# Patient Record
Sex: MALE | Race: WHITE | NOT HISPANIC OR LATINO | Employment: FULL TIME | URBAN - METROPOLITAN AREA
[De-identification: names, ages, dates, MRNs, and addresses within clinical notes are randomized per-mention and may not be internally consistent; named-entity substitution may affect disease eponyms.]

---

## 2017-07-27 ENCOUNTER — HOSPITAL ENCOUNTER (EMERGENCY)
Facility: HOSPITAL | Age: 37
Discharge: HOME | End: 2017-07-27
Attending: EMERGENCY MEDICINE | Admitting: EMERGENCY MEDICINE

## 2017-07-27 ENCOUNTER — APPOINTMENT (OUTPATIENT)
Dept: GENERAL RADIOLOGY | Facility: HOSPITAL | Age: 37
End: 2017-07-27

## 2017-07-27 VITALS
HEIGHT: 72 IN | WEIGHT: 190 LBS | SYSTOLIC BLOOD PRESSURE: 134 MMHG | RESPIRATION RATE: 16 BRPM | DIASTOLIC BLOOD PRESSURE: 90 MMHG | BODY MASS INDEX: 25.73 KG/M2 | TEMPERATURE: 95.8 F | HEART RATE: 61 BPM | OXYGEN SATURATION: 97 %

## 2017-07-27 DIAGNOSIS — R07.89 ATYPICAL CHEST PAIN: Primary | ICD-10-CM

## 2017-07-27 LAB
ALBUMIN SERPL-MCNC: 4.5 G/DL (ref 3.5–5.2)
ALBUMIN/GLOB SERPL: 1.6 G/DL
ALP SERPL-CCNC: 83 U/L (ref 39–117)
ALT SERPL W P-5'-P-CCNC: 44 U/L (ref 1–41)
ANION GAP SERPL CALCULATED.3IONS-SCNC: 11.1 MMOL/L
AST SERPL-CCNC: 32 U/L (ref 1–40)
BASOPHILS # BLD AUTO: 0.01 10*3/MM3 (ref 0–0.2)
BASOPHILS NFR BLD AUTO: 0.2 % (ref 0–1.5)
BILIRUB SERPL-MCNC: 0.5 MG/DL (ref 0.1–1.2)
BUN BLD-MCNC: 15 MG/DL (ref 6–20)
BUN/CREAT SERPL: 14.9 (ref 7–25)
CALCIUM SPEC-SCNC: 9.4 MG/DL (ref 8.6–10.5)
CHLORIDE SERPL-SCNC: 101 MMOL/L (ref 98–107)
CO2 SERPL-SCNC: 27.9 MMOL/L (ref 22–29)
CREAT BLD-MCNC: 1.01 MG/DL (ref 0.76–1.27)
D DIMER PPP FEU-MCNC: 0.27 MCGFEU/ML (ref 0–0.49)
DEPRECATED RDW RBC AUTO: 40.3 FL (ref 37–54)
EOSINOPHIL # BLD AUTO: 0.05 10*3/MM3 (ref 0–0.7)
EOSINOPHIL NFR BLD AUTO: 0.8 % (ref 0.3–6.2)
ERYTHROCYTE [DISTWIDTH] IN BLOOD BY AUTOMATED COUNT: 12.7 % (ref 11.5–14.5)
GFR SERPL CREATININE-BSD FRML MDRD: 84 ML/MIN/1.73
GLOBULIN UR ELPH-MCNC: 2.9 GM/DL
GLUCOSE BLD-MCNC: 132 MG/DL (ref 65–99)
HCT VFR BLD AUTO: 43.9 % (ref 40.4–52.2)
HGB BLD-MCNC: 15.4 G/DL (ref 13.7–17.6)
IMM GRANULOCYTES # BLD: 0 10*3/MM3 (ref 0–0.03)
IMM GRANULOCYTES NFR BLD: 0 % (ref 0–0.5)
LYMPHOCYTES # BLD AUTO: 1.36 10*3/MM3 (ref 0.9–4.8)
LYMPHOCYTES NFR BLD AUTO: 21.9 % (ref 19.6–45.3)
MCH RBC QN AUTO: 31.1 PG (ref 27–32.7)
MCHC RBC AUTO-ENTMCNC: 35.1 G/DL (ref 32.6–36.4)
MCV RBC AUTO: 88.7 FL (ref 79.8–96.2)
MONOCYTES # BLD AUTO: 0.43 10*3/MM3 (ref 0.2–1.2)
MONOCYTES NFR BLD AUTO: 6.9 % (ref 5–12)
NEUTROPHILS # BLD AUTO: 4.36 10*3/MM3 (ref 1.9–8.1)
NEUTROPHILS NFR BLD AUTO: 70.2 % (ref 42.7–76)
PLATELET # BLD AUTO: 150 10*3/MM3 (ref 140–500)
PMV BLD AUTO: 10.3 FL (ref 6–12)
POTASSIUM BLD-SCNC: 3.9 MMOL/L (ref 3.5–5.2)
PROT SERPL-MCNC: 7.4 G/DL (ref 6–8.5)
RBC # BLD AUTO: 4.95 10*6/MM3 (ref 4.6–6)
SODIUM BLD-SCNC: 140 MMOL/L (ref 136–145)
TROPONIN T SERPL-MCNC: <0.01 NG/ML (ref 0–0.03)
WBC NRBC COR # BLD: 6.21 10*3/MM3 (ref 4.5–10.7)

## 2017-07-27 PROCEDURE — 93010 ELECTROCARDIOGRAM REPORT: CPT | Performed by: INTERNAL MEDICINE

## 2017-07-27 PROCEDURE — 85379 FIBRIN DEGRADATION QUANT: CPT | Performed by: EMERGENCY MEDICINE

## 2017-07-27 PROCEDURE — 25010000002 KETOROLAC TROMETHAMINE PER 15 MG: Performed by: EMERGENCY MEDICINE

## 2017-07-27 PROCEDURE — 99284 EMERGENCY DEPT VISIT MOD MDM: CPT

## 2017-07-27 PROCEDURE — 71020 HC CHEST PA AND LATERAL: CPT

## 2017-07-27 PROCEDURE — 99283 EMERGENCY DEPT VISIT LOW MDM: CPT

## 2017-07-27 PROCEDURE — 84484 ASSAY OF TROPONIN QUANT: CPT | Performed by: EMERGENCY MEDICINE

## 2017-07-27 PROCEDURE — 85025 COMPLETE CBC W/AUTO DIFF WBC: CPT | Performed by: EMERGENCY MEDICINE

## 2017-07-27 PROCEDURE — 96374 THER/PROPH/DIAG INJ IV PUSH: CPT

## 2017-07-27 PROCEDURE — 93005 ELECTROCARDIOGRAM TRACING: CPT | Performed by: EMERGENCY MEDICINE

## 2017-07-27 PROCEDURE — 80053 COMPREHEN METABOLIC PANEL: CPT | Performed by: EMERGENCY MEDICINE

## 2017-07-27 RX ORDER — METAXALONE 800 MG/1
800 TABLET ORAL 3 TIMES DAILY
Qty: 15 TABLET | Refills: 0 | Status: SHIPPED | OUTPATIENT
Start: 2017-07-27 | End: 2017-12-18

## 2017-07-27 RX ORDER — KETOROLAC TROMETHAMINE 30 MG/ML
30 INJECTION, SOLUTION INTRAMUSCULAR; INTRAVENOUS ONCE
Status: COMPLETED | OUTPATIENT
Start: 2017-07-27 | End: 2017-07-27

## 2017-07-27 RX ORDER — SODIUM CHLORIDE 0.9 % (FLUSH) 0.9 %
10 SYRINGE (ML) INJECTION AS NEEDED
Status: DISCONTINUED | OUTPATIENT
Start: 2017-07-27 | End: 2017-07-27 | Stop reason: HOSPADM

## 2017-07-27 RX ADMIN — KETOROLAC TROMETHAMINE 30 MG: 30 INJECTION, SOLUTION INTRAMUSCULAR at 09:43

## 2017-08-01 ENCOUNTER — TELEPHONE (OUTPATIENT)
Dept: SOCIAL WORK | Facility: HOSPITAL | Age: 37
End: 2017-08-01

## 2017-12-18 ENCOUNTER — HOSPITAL ENCOUNTER (OUTPATIENT)
Dept: GENERAL RADIOLOGY | Facility: HOSPITAL | Age: 37
Discharge: HOME | End: 2017-12-18
Admitting: FAMILY MEDICINE

## 2017-12-18 ENCOUNTER — OFFICE VISIT (OUTPATIENT)
Dept: FAMILY MEDICINE CLINIC | Facility: CLINIC | Age: 37
End: 2017-12-18

## 2017-12-18 VITALS
TEMPERATURE: 98.3 F | SYSTOLIC BLOOD PRESSURE: 134 MMHG | WEIGHT: 199.8 LBS | HEIGHT: 72 IN | OXYGEN SATURATION: 100 % | DIASTOLIC BLOOD PRESSURE: 88 MMHG | BODY MASS INDEX: 27.06 KG/M2 | HEART RATE: 93 BPM

## 2017-12-18 DIAGNOSIS — M54.41 ACUTE BILATERAL LOW BACK PAIN WITH BILATERAL SCIATICA: Primary | ICD-10-CM

## 2017-12-18 DIAGNOSIS — M54.42 ACUTE BILATERAL LOW BACK PAIN WITH BILATERAL SCIATICA: Primary | ICD-10-CM

## 2017-12-18 PROCEDURE — 99213 OFFICE O/P EST LOW 20 MIN: CPT | Performed by: FAMILY MEDICINE

## 2017-12-18 PROCEDURE — 72110 X-RAY EXAM L-2 SPINE 4/>VWS: CPT

## 2017-12-18 RX ORDER — NAPROXEN 500 MG/1
500 TABLET ORAL 2 TIMES DAILY WITH MEALS
Qty: 30 TABLET | Refills: 0 | Status: SHIPPED | OUTPATIENT
Start: 2017-12-18 | End: 2019-03-19

## 2017-12-18 NOTE — PROGRESS NOTES
"Subjective   Mookie Torres is a 37 y.o. male.     Chief Complaint   Patient presents with   • Back Pain     x fri after picking up a tv         History of Present Illness    Low back pain.  Sudden onset.  Friday.  At home.  He moved recently.  Doing some lifting.  Nothing very heavy.  He was lifting up a television, but over at the waist.  Lifted up.  And suddenly had back spasm and pain.  Felt like something \"gave out\".  He crumpled to the ground.  He's had some bilateral anterior thigh pain.  He's had no weakness.  No numbness.  He feels like sometimes when he walks a things are going to \"give way\" and he feels \"unstable\".  He's had no urinary changes.  No blood in the urine.  He's had no bowel movements, he has been constipated.  He is taking Advil or over-the-counter Aleve.  No previous back trauma in recent months or weeks.  He has a remote history of opioid dependence.  He's been on Suboxone treatment for some years.      The following portions of the patient's history were reviewed and updated as appropriate: allergies, current medications, past family history, past medical history, past social history, past surgical history and problem list.          Review of Systems   Constitutional: Negative for fever.   Genitourinary: Negative.    Musculoskeletal: Positive for back pain. Negative for joint swelling.   Neurological: Negative for weakness and numbness.       Objective   Blood pressure 134/88, pulse 93, temperature 98.3 °F (36.8 °C), temperature source Oral, height 182.9 cm (72.01\"), weight 90.6 kg (199 lb 12.8 oz), SpO2 100 %.  Physical Exam   Constitutional: No distress.   Musculoskeletal:   Lumbar spine with limited range of motion.  He has no midline pain to palpation.  There some bilateral paralumbar tenderness and also some left-sided spasm at about the L5 level.  There is a negative straight leg lift.  Although he does have some back pain with straight leg lift but no radiculopathy.  The ankles and " knees half +2 out of 4 reflexes.  Strength is 5 out of 5 all major muscular in lower extremity.  Gait is fairly unremarkable.  He is guarding somewhat because of the back pain.       Assessment/Plan   Mookie was seen today for back pain.    Diagnoses and all orders for this visit:    Acute bilateral low back pain with bilateral sciatica  -     XR Spine Lumbar 4+ View  -     Ambulatory Referral to Physical Therapy Evaluate and treat    Other orders  -     naproxen (NAPROSYN) 500 MG tablet; Take 1 tablet by mouth 2 (Two) Times a Day With Meals.      Acute musculoskeletal low back pain with some referred pain versus sciatica to the bilateral anterior thighs.  At this time no definite evidence of neurological claudication or other severe symptoms.  Patient is concerned about a feeling of instability.  I am getting an x-ray of lumbar spine to rule out significant spondylolisthesis or other abnormality.  Prescribing naproxen 500 mg twice a day as needed.  Also recommend physical therapy.  I believe the patient should work.  At this time he is okay for light lifting.  If symptoms persist into the new year, about 2 weeks that is, recommend he let us know I would need further evaluation.

## 2017-12-19 NOTE — PROGRESS NOTES
The x-ray of the spine showed no obvious cause of back pain.  Continue current plan.  If not better in 6 weeks, let us know.

## 2018-06-25 ENCOUNTER — OFFICE VISIT (OUTPATIENT)
Dept: FAMILY MEDICINE CLINIC | Facility: CLINIC | Age: 38
End: 2018-06-25

## 2018-06-25 VITALS
HEIGHT: 72 IN | DIASTOLIC BLOOD PRESSURE: 90 MMHG | OXYGEN SATURATION: 99 % | BODY MASS INDEX: 28.23 KG/M2 | WEIGHT: 208.4 LBS | TEMPERATURE: 98.1 F | HEART RATE: 61 BPM | SYSTOLIC BLOOD PRESSURE: 132 MMHG

## 2018-06-25 DIAGNOSIS — J45.990 EXERCISE-INDUCED BRONCHOSPASM: ICD-10-CM

## 2018-06-25 DIAGNOSIS — M10.071 ACUTE IDIOPATHIC GOUT OF RIGHT FOOT: Primary | ICD-10-CM

## 2018-06-25 PROCEDURE — 99213 OFFICE O/P EST LOW 20 MIN: CPT | Performed by: FAMILY MEDICINE

## 2018-06-25 RX ORDER — RANITIDINE 150 MG/1
150 TABLET ORAL 2 TIMES DAILY
Qty: 30 TABLET | Refills: 0 | Status: SHIPPED | OUTPATIENT
Start: 2018-06-25 | End: 2019-03-19

## 2018-06-25 RX ORDER — ALBUTEROL SULFATE 90 UG/1
2 AEROSOL, METERED RESPIRATORY (INHALATION) EVERY 4 HOURS PRN
Qty: 1 INHALER | Refills: 1 | OUTPATIENT
Start: 2018-06-25 | End: 2021-09-23

## 2018-06-25 RX ORDER — PREDNISONE 20 MG/1
TABLET ORAL
Qty: 18 TABLET | Refills: 0 | Status: SHIPPED | OUTPATIENT
Start: 2018-06-25 | End: 2018-07-11 | Stop reason: SDUPTHER

## 2018-06-25 NOTE — PATIENT INSTRUCTIONS
Gout  Gout is painful swelling that can occur in some of your joints. Gout is a type of arthritis. This condition is caused by having too much uric acid in your body. Uric acid is a chemical that forms when your body breaks down substances called purines. Purines are important for building body proteins.  When your body has too much uric acid, sharp crystals can form and build up inside your joints. This causes pain and swelling. Gout attacks can happen quickly and be very painful (acute gout). Over time, the attacks can affect more joints and become more frequent (chronic gout). Gout can also cause uric acid to build up under your skin and inside your kidneys.  What are the causes?  This condition is caused by too much uric acid in your blood. This can occur because:  · Your kidneys do not remove enough uric acid from your blood. This is the most common cause.  · Your body makes too much uric acid. This can occur with some cancers and cancer treatments. It can also occur if your body is breaking down too many red blood cells (hemolytic anemia).  · You eat too many foods that are high in purines. These foods include organ meats and some seafood. Alcohol, especially beer, is also high in purines.    A gout attack may be triggered by trauma or stress.  What increases the risk?  This condition is more likely to develop in people who:  · Have a family history of gout.  · Are male and middle-aged.  · Are female and have gone through menopause.  · Are obese.  · Frequently drink alcohol, especially beer.  · Are dehydrated.  · Lose weight too quickly.  · Have an organ transplant.  · Have lead poisoning.  · Take certain medicines, including aspirin, cyclosporine, diuretics, levodopa, and niacin.  · Have kidney disease or psoriasis.    What are the signs or symptoms?  An attack of acute gout happens quickly. It usually occurs in just one joint. The most common place is the big toe. Attacks often start at night. Other joints  that may be affected include joints of the feet, ankle, knee, fingers, wrist, or elbow. Symptoms may include:  · Severe pain.  · Warmth.  · Swelling.  · Stiffness.  · Tenderness. The affected joint may be very painful to touch.  · Shiny, red, or purple skin.  · Chills and fever.    Chronic gout may cause symptoms more frequently. More joints may be involved. You may also have white or yellow lumps (tophi) on your hands or feet or in other areas near your joints.  How is this diagnosed?  This condition is diagnosed based on your symptoms, medical history, and physical exam. You may have tests, such as:  · Blood tests to measure uric acid levels.  · Removal of joint fluid with a needle (aspiration) to look for uric acid crystals.  · X-rays to look for joint damage.    How is this treated?  Treatment for this condition has two phases: treating an acute attack and preventing future attacks. Acute gout treatment may include medicines to reduce pain and swelling, including:  · NSAIDs.  · Steroids. These are strong anti-inflammatory medicines that can be taken by mouth (orally) or injected into a joint.  · Colchicine. This medicine relieves pain and swelling when it is taken soon after an attack. It can be given orally or through an IV tube.    Preventive treatment may include:  · Daily use of smaller doses of NSAIDs or colchicine.  · Use of a medicine that reduces uric acid levels in your blood.  · Changes to your diet. You may need to see a specialist about healthy eating (dietitian).    Follow these instructions at home:  During a Gout Attack  · If directed, apply ice to the affected area:  ? Put ice in a plastic bag.  ? Place a towel between your skin and the bag.  ? Leave the ice on for 20 minutes, 2-3 times a day.  · Rest the joint as much as possible. If the affected joint is in your leg, you may be given crutches to use.  · Raise (elevate) the affected joint above the level of your heart as often as  possible.  · Drink enough fluids to keep your urine clear or pale yellow.  · Take over-the-counter and prescription medicines only as told by your health care provider.  · Do not drive or operate heavy machinery while taking prescription pain medicine.  · Follow instructions from your health care provider about eating or drinking restrictions.  · Return to your normal activities as told by your health care provider. Ask your health care provider what activities are safe for you.  Avoiding Future Gout Attacks  · Follow a low-purine diet as told by your dietitian or health care provider. Avoid foods and drinks that are high in purines, including liver, kidney, anchovies, asparagus, herring, mushrooms, mussels, and beer.  · Limit alcohol intake to no more than 1 drink a day for nonpregnant women and 2 drinks a day for men. One drink equals 12 oz of beer, 5 oz of wine, or 1½ oz of hard liquor.  · Maintain a healthy weight or lose weight if you are overweight. If you want to lose weight, talk with your health care provider. It is important that you do not lose weight too quickly.  · Start or maintain an exercise program as told by your health care provider.  · Drink enough fluids to keep your urine clear or pale yellow.  · Take over-the-counter and prescription medicines only as told by your health care provider.  · Keep all follow-up visits as told by your health care provider. This is important.  Contact a health care provider if:  · You have another gout attack.  · You continue to have symptoms of a gout attack after10 days of treatment.  · You have side effects from your medicines.  · You have chills or a fever.  · You have burning pain when you urinate.  · You have pain in your lower back or belly.  Get help right away if:  · You have severe or uncontrolled pain.  · You cannot urinate.  This information is not intended to replace advice given to you by your health care provider. Make sure you discuss any questions  you have with your health care provider.  Document Released: 12/15/2001 Document Revised: 05/25/2017 Document Reviewed: 09/29/2016  [employer]vier Interactive Patient Education © 2018 [employer]

## 2018-06-25 NOTE — PROGRESS NOTES
"Subjective   Mookie Torres is a 37 y.o. male.     Chief Complaint   Patient presents with   • Foot Pain     right foot pain, NKI x 9 days        History of Present Illness    9 days of right foot pain.  Based of his great toe.  Bothers him.  Swollen.  Decreased range of motion.  Painful to walk on it.  Bothers him at night sometimes.  He feels a little bit nauseated and ill at times.  Otherwise doing well.  He states he has a poor diet.  Eats a lot of red meat.  He drinks some block on weekends.  One or 2 shots.  He continues to be in remission for his previous opioid abuse disorder.  He continues on Suboxone treatment by a outside provider.  He's had similar foot pain in the past.  Same spot.  Right great toe area.  2 times in the last year.  Last about 3 days each.  The pain is 7 out of 10 at times.      The following portions of the patient's history were reviewed and updated as appropriate: allergies, current medications, past family history, past medical history, past social history, past surgical history and problem list.          Review of Systems   Constitutional: Negative.  Negative for fever.   Respiratory: Negative.    Cardiovascular: Negative.    Musculoskeletal: Positive for joint swelling.       Objective   Blood pressure 132/90, pulse 61, temperature 98.1 °F (36.7 °C), temperature source Oral, height 182.9 cm (72.01\"), weight 94.5 kg (208 lb 6.4 oz), SpO2 99 %.  Physical Exam   Constitutional: He appears well-developed and well-nourished.   Cardiovascular: Normal rate and regular rhythm.    Pulmonary/Chest: Effort normal.   Musculoskeletal:   Right phalangeal metatarsal joint, #1, reveals decreased range of motion with some overlying mild bela erythema.  There is some mild joint line tenderness over this.  Aspect.  There is no pain with compression of the metatarsal heads.  Tendon function intact.  Full range of motion ankle.  He ambulates with mild pain.       Assessment/Plan   Mookie was seen today " for foot pain.    Diagnoses and all orders for this visit:    Acute idiopathic gout of right foot  -     Basic Metabolic Panel  -     Uric acid    Exercise-induced bronchospasm  -     albuterol (PROVENTIL HFA;VENTOLIN HFA) 108 (90 Base) MCG/ACT inhaler; Inhale 2 puffs Every 4 (Four) Hours As Needed for Wheezing.    Other orders  -     predniSONE (DELTASONE) 20 MG tablet; 3 po qd for 3 days then 2 po qd for 3 days then 1 po qd for 3 days then stop  -     raNITIdine (ZANTAC) 150 MG tablet; Take 1 tablet by mouth 2 (Two) Times a Day. To prevent stomach upset with prednison        Probable gout.  Checking BMP and uric acid.  Instructions given.  Differential diagnosis includes pseudogout, tendinopathy, possible Link's neuroma.  No evidence of cellulitis.  This is most likely gout.  Recommend improve diet with decreased red meat.  I cannot recommend alcohol use.  Prednisone taper.  Zantac as needed for GI upset, patient states he was taking naproxen for this that cause stomach upset.  He will call if not improving within 72 hours.  To consider allopurinol use and recurrent symptoms.

## 2018-06-26 LAB
BUN SERPL-MCNC: 19 MG/DL (ref 6–20)
BUN/CREAT SERPL: 15.8 (ref 7–25)
CALCIUM SERPL-MCNC: 10.4 MG/DL (ref 8.6–10.5)
CHLORIDE SERPL-SCNC: 94 MMOL/L (ref 98–107)
CO2 SERPL-SCNC: 31.6 MMOL/L (ref 22–29)
CREAT SERPL-MCNC: 1.2 MG/DL (ref 0.76–1.27)
GFR SERPLBLD CREATININE-BSD FMLA CKD-EPI: 68 ML/MIN/1.73
GFR SERPLBLD CREATININE-BSD FMLA CKD-EPI: 83 ML/MIN/1.73
GLUCOSE SERPL-MCNC: 80 MG/DL (ref 65–99)
POTASSIUM SERPL-SCNC: 4 MMOL/L (ref 3.5–5.2)
SODIUM SERPL-SCNC: 138 MMOL/L (ref 136–145)
URATE SERPL-MCNC: 8.5 MG/DL (ref 3.4–7)

## 2018-07-11 DIAGNOSIS — E79.0 ELEVATED BLOOD URIC ACID LEVEL: Primary | ICD-10-CM

## 2018-07-11 RX ORDER — ALLOPURINOL 100 MG/1
100 TABLET ORAL DAILY
Qty: 30 TABLET | Refills: 5 | Status: CANCELLED | OUTPATIENT
Start: 2018-07-11

## 2018-07-11 RX ORDER — PREDNISONE 20 MG/1
TABLET ORAL
Qty: 18 TABLET | Refills: 0 | Status: SHIPPED | OUTPATIENT
Start: 2018-07-11 | End: 2018-08-17

## 2018-07-11 RX ORDER — ALLOPURINOL 100 MG/1
100 TABLET ORAL DAILY
Qty: 30 TABLET | Refills: 5 | Status: SHIPPED | OUTPATIENT
Start: 2018-07-11 | End: 2018-08-20

## 2018-07-11 NOTE — TELEPHONE ENCOUNTER
Pt is calling he was seen on 6/25/18 for gout of the right foot. Was given prednisone. He said this has helped for about 1 wk. But it now has came back. But per your not It said that you would consider allopurinol. Please advise.

## 2018-07-11 NOTE — TELEPHONE ENCOUNTER
Please refill the previous prednisone dose.  Once the gout is improving, then start allopurinol.  If he starts the allopurinol today, it could make the gout worse.  He needs to see me if not improving soon.

## 2018-08-17 ENCOUNTER — OFFICE VISIT (OUTPATIENT)
Dept: FAMILY MEDICINE CLINIC | Facility: CLINIC | Age: 38
End: 2018-08-17

## 2018-08-17 VITALS
BODY MASS INDEX: 28.31 KG/M2 | DIASTOLIC BLOOD PRESSURE: 102 MMHG | HEIGHT: 72 IN | WEIGHT: 209 LBS | HEART RATE: 99 BPM | SYSTOLIC BLOOD PRESSURE: 160 MMHG | OXYGEN SATURATION: 96 % | TEMPERATURE: 98.7 F

## 2018-08-17 DIAGNOSIS — I10 ESSENTIAL HYPERTENSION: Primary | ICD-10-CM

## 2018-08-17 DIAGNOSIS — M10.071 ACUTE IDIOPATHIC GOUT OF RIGHT FOOT: ICD-10-CM

## 2018-08-17 DIAGNOSIS — L20.82 FLEXURAL ECZEMA: ICD-10-CM

## 2018-08-17 PROCEDURE — 99214 OFFICE O/P EST MOD 30 MIN: CPT | Performed by: FAMILY MEDICINE

## 2018-08-17 RX ORDER — PREDNISONE 20 MG/1
60 TABLET ORAL DAILY
Qty: 15 TABLET | Refills: 0 | Status: SHIPPED | OUTPATIENT
Start: 2018-08-17 | End: 2019-03-19

## 2018-08-17 RX ORDER — LISINOPRIL 10 MG/1
10 TABLET ORAL DAILY
Qty: 90 TABLET | Refills: 1 | Status: SHIPPED | OUTPATIENT
Start: 2018-08-17 | End: 2021-09-23

## 2018-08-17 NOTE — PROGRESS NOTES
Subjective   Mookie Torres is a 37 y.o. male.     Chief Complaint   Patient presents with   • Rash     on arms and chest x 5 days needs a note for work   • Gout      right foot x 2 wks   • Headache     not able to eat due to he feels full        History of Present Illness    Patient complains of a gout flare.  Mid foot.  Right foot.  2 weeks ago.  He was taking aspirin which seemed to help.  He was diagnosed with gout here about a month or 2 ago.  Uric acid level is high.  We treated with prednisone and a H2 blocker to help with GI symptoms.  Was then started on allopurinol once a clear.  Is now 100 mg allopurinol daily.  He states the symptoms are now much better.    Rash.  Bilateral anterior cubital fossa.  A few areas on his chest which is now better.  No new foods.  No new medication other than the allopurinol 2 months ago.  Some allergy symptoms or cold symptoms recently.  No new soaps or detergents.  Itchy.  He's had some stressors.  Mother has been ill in the hospital.  His aunt recently .    Hypertension.  Blood pressure elevated today.  Borderline elevated recently.  He states he was treated for high blood pressure years ago he's not sure what, possibly metoprolol.  Mild headaches.  Some fullness in his abdomen with decreased appetite.  No blood in the stool no change in the stool no vomiting.  No weight loss.  No pain in the abdomen.  No severe headache.  No chest pain.  No shortness of breath.  No exertional symptoms.      The following portions of the patient's history were reviewed and updated as appropriate: allergies, current medications, past family history, past medical history, past social history, past surgical history and problem list.          Review of Systems   Constitutional: Negative.    Respiratory: Negative.    Cardiovascular: Negative.    Musculoskeletal: Positive for joint swelling.   Neurological: Positive for headaches.   Psychiatric/Behavioral: Negative.        Objective   Blood  "pressure (!) 160/102, pulse 99, temperature 98.7 °F (37.1 °C), temperature source Oral, height 182.9 cm (72.01\"), weight 94.8 kg (209 lb), SpO2 96 %.  Physical Exam   Constitutional: He appears well-developed and well-nourished. No distress.   HENT:   Head: Normocephalic and atraumatic.   Eyes: Pupils are equal, round, and reactive to light. Conjunctivae and EOM are normal.   Neck: Normal range of motion. Neck supple. No thyromegaly present.   Cardiovascular: Normal rate, regular rhythm, normal heart sounds and intact distal pulses.    Pulmonary/Chest: Effort normal and breath sounds normal.   Abdominal: Soft. Bowel sounds are normal. He exhibits no distension and no mass. There is no tenderness. There is no guarding.   Musculoskeletal: He exhibits no edema.   Examination right foot is unremarkable.  No erythema.  No swelling.  No tenderness to palpation.  Minimal discomfort with ambulation.   Skin: Skin is warm and dry. Rash noted.   A small eczematous vesicular rash bilateral antecubital fossa.  No other rash noted.  No synovitis.   Psychiatric: He has a normal mood and affect. His behavior is normal. Judgment and thought content normal.   Nursing note and vitals reviewed.      Assessment/Plan   Mookie was seen today for rash, gout and headache.    Diagnoses and all orders for this visit:    Essential hypertension  -     Uric acid  -     Comprehensive Metabolic Panel    Acute idiopathic gout of right foot  -     Uric acid  -     Comprehensive Metabolic Panel    Flexural eczema    Other orders  -     lisinopril (PRINIVIL,ZESTRIL) 10 MG tablet; Take 1 tablet by mouth Daily.  -     triamcinolone (KENALOG) 0.1 % ointment; Apply  topically to the appropriate area as directed 2 (Two) Times a Day.  -     predniSONE (DELTASONE) 20 MG tablet; Take 3 tablets by mouth Daily. For severe gout symptoms      Hypertension.  Recurrent diagnosis.  Recommend lisinopril 10 mg daily.  Recheck 6 weeks.  Patient understands go directly " to the emergency room with severe headache or other severe symptoms.    Gout.  Recent flare.  Resolving.  Recheck uric acid.  Target less than 6.  Increase allopurinol if needed.  Patient does have a prescription for prednisone he can take for a gout flare.  He should take this with ranitidine help reduce stomach upset.  Next    Eczema.  Likely related to situational stressors.  Triamcinolone ointment as needed.

## 2018-08-18 LAB
ALBUMIN SERPL-MCNC: 5 G/DL (ref 3.5–5.2)
ALBUMIN/GLOB SERPL: 2.1 G/DL
ALP SERPL-CCNC: 100 U/L (ref 39–117)
ALT SERPL-CCNC: 195 U/L (ref 1–41)
AST SERPL-CCNC: 85 U/L (ref 1–40)
BILIRUB SERPL-MCNC: 0.7 MG/DL (ref 0.1–1.2)
BUN SERPL-MCNC: 16 MG/DL (ref 6–20)
BUN/CREAT SERPL: 13.6 (ref 7–25)
CALCIUM SERPL-MCNC: 9.4 MG/DL (ref 8.6–10.5)
CHLORIDE SERPL-SCNC: 96 MMOL/L (ref 98–107)
CO2 SERPL-SCNC: 29.4 MMOL/L (ref 22–29)
CREAT SERPL-MCNC: 1.18 MG/DL (ref 0.76–1.27)
GLOBULIN SER CALC-MCNC: 2.4 GM/DL
GLUCOSE SERPL-MCNC: 178 MG/DL (ref 65–99)
POTASSIUM SERPL-SCNC: 3.7 MMOL/L (ref 3.5–5.2)
PROT SERPL-MCNC: 7.4 G/DL (ref 6–8.5)
SODIUM SERPL-SCNC: 140 MMOL/L (ref 136–145)
URATE SERPL-MCNC: 7.3 MG/DL (ref 3.4–7)

## 2018-08-20 ENCOUNTER — RESULTS ENCOUNTER (OUTPATIENT)
Dept: FAMILY MEDICINE CLINIC | Facility: CLINIC | Age: 38
End: 2018-08-20

## 2018-08-20 DIAGNOSIS — R79.89 ELEVATED LFTS: ICD-10-CM

## 2018-08-20 DIAGNOSIS — R79.89 ELEVATED LFTS: Primary | ICD-10-CM

## 2018-08-20 DIAGNOSIS — M10.071 ACUTE IDIOPATHIC GOUT OF RIGHT FOOT: ICD-10-CM

## 2018-08-20 NOTE — PROGRESS NOTES
I called patient this morning.  Spoke with him.  He is to stop the allopurinol.  He understands to have lab work done later this week.  See note in chart.

## 2018-08-20 NOTE — PROGRESS NOTES
Phone call with patient.  Elevation of liver enzymes.  Especially the ALT.  He's had mild elevations before, but now moderately to severely elevated.  We reviewed his alcohol intake.  Minimal.  He is not taking acetaminophen.  He's had some intermittent bloating and nonspecific abdominal discomfort.  No right upper quadrant pain.  No pain with eating.  Negative hepatitis C testing about 3 years ago.  Recently started on allopurinol for prevention of gout.  Allopurinol can cause hepatotoxicity.  I'm recommending he stop the allopurinol.  He is going to make a lab appointment later this week to reevaluate liver function testing plus workup for other causes of elevated liver enzymes, see lab orders.  At this point he does not need to see me in the office since he overall feels well with exception of the abdominal bloating.  However he understands worse symptoms he is to see me.  If above lab work negative, and liver enzymes not improved off allopurinol, recommend ultrasound.  Also consultation with specialty care if indicated.  He has a order of prednisone available to take if the gout becomes severe.  He currently is gout pain free.

## 2018-08-24 LAB
ACTIN IGG SERPL-ACNC: 13 UNITS (ref 0–19)
ALBUMIN SERPL-MCNC: 4.7 G/DL (ref 3.5–5.2)
ALP SERPL-CCNC: 94 U/L (ref 39–117)
ALT SERPL-CCNC: 170 U/L (ref 1–41)
AST SERPL-CCNC: 78 U/L (ref 1–40)
BILIRUB DIRECT SERPL-MCNC: <0.2 MG/DL (ref 0–0.3)
BILIRUB SERPL-MCNC: 0.6 MG/DL (ref 0.1–1.2)
FERRITIN SERPL-MCNC: 433.6 NG/ML (ref 30–400)
HBV CORE AB SERPL QL IA: NEGATIVE
HBV CORE IGM SERPL QL IA: NEGATIVE
HBV E AB SERPL QL IA: NEGATIVE
HBV E AG SERPL QL IA: NEGATIVE
HBV SURFACE AB SER QL: NON REACTIVE
HBV SURFACE AG SERPL QL IA: NEGATIVE
HCV AB S/CO SERPL IA: 0.1 S/CO RATIO (ref 0–0.9)
IRON SATN MFR SERPL: 27 % (ref 20–50)
IRON SERPL-MCNC: 103 MCG/DL (ref 59–158)
LABORATORY COMMENT REPORT: NORMAL
MITOCHONDRIA M2 IGG SER-ACNC: <20 UNITS (ref 0–20)
PROT SERPL-MCNC: 7.6 G/DL (ref 6–8.5)
TIBC SERPL-MCNC: 383 MCG/DL
UIBC SERPL-MCNC: 280 MCG/DL

## 2018-08-25 ENCOUNTER — RESULTS ENCOUNTER (OUTPATIENT)
Dept: FAMILY MEDICINE CLINIC | Facility: CLINIC | Age: 38
End: 2018-08-25

## 2018-08-25 DIAGNOSIS — R79.89 ELEVATED LFTS: ICD-10-CM

## 2018-08-27 DIAGNOSIS — R79.89 ELEVATED LFTS: ICD-10-CM

## 2018-08-27 DIAGNOSIS — R79.89 ELEVATED LFTS: Primary | ICD-10-CM

## 2018-08-27 NOTE — PROGRESS NOTES
The liver enzymes are slightly improved, but no cause of elevated liver enzymes found yet.  The infectious hepatitis tests are negative.  No auto immune hepatitis found.  The serum iron levels are normal, but the iron storage level is slightly high.  Some people can have a genetic mutation called hemochromatosis which can cause this issue.  It is related to improper iron storage.  I have ordered a special genetic test.  May need to have further blood draw.  If the patient has further questions please let me know.

## 2018-08-29 DIAGNOSIS — R79.89 ELEVATED LFTS: Primary | ICD-10-CM

## 2018-08-29 LAB
HFE GENE MUT ANL BLD/T: NORMAL
Lab: NORMAL
SPECIMEN STATUS: NORMAL
WRITTEN AUTHORIZATION: NORMAL

## 2018-09-04 LAB
FERRITIN SERPL-MCNC: 355.6 NG/ML (ref 30–400)
HFE GENE MUT ANL BLD/T: NORMAL
IRON SATN MFR SERPL: 41 % (ref 20–50)
IRON SERPL-MCNC: 154 MCG/DL (ref 59–158)
TIBC SERPL-MCNC: 375 MCG/DL
UIBC SERPL-MCNC: 221 MCG/DL

## 2019-04-17 ENCOUNTER — TELEPHONE (OUTPATIENT)
Dept: FAMILY MEDICINE CLINIC | Facility: CLINIC | Age: 39
End: 2019-04-17

## 2019-04-17 NOTE — TELEPHONE ENCOUNTER
I cannot recommend medication without seeing him.  His blood pressure was very high at the urgent care center about a month ago when he was in for gout.  And also I have not seen him since last year.  Blood pressure was high then.  Certain gout treatments can make blood pressure worse.  We can see him today or tomorrow.

## 2019-04-17 NOTE — TELEPHONE ENCOUNTER
Pt is calling saying that he has a flare up of gout again and wanted some steroids for this. Pt was not been seen since Aug 2018. Please advise

## 2020-01-20 ENCOUNTER — APPOINTMENT (OUTPATIENT)
Dept: GENERAL RADIOLOGY | Facility: HOSPITAL | Age: 40
End: 2020-01-20

## 2020-01-20 PROCEDURE — 72110 X-RAY EXAM L-2 SPINE 4/>VWS: CPT | Performed by: FAMILY MEDICINE

## 2020-10-13 ENCOUNTER — TELEPHONE (OUTPATIENT)
Dept: URGENT CARE | Facility: CLINIC | Age: 40
End: 2020-10-13